# Patient Record
(demographics unavailable — no encounter records)

---

## 2025-07-11 NOTE — PLAN
[FreeTextEntry1] : # HCM: due for mammo and DEXA UTD colonoscopy 2019  # T2DM: consider replacing mounjaro - due to weight loss and low appetite, encourage excercsie and inc protein, check a1c  # HTN: BP at goal continue lisinopril 2.5 mg QD # HLD: Check lipids # fatigue: anemia panel

## 2025-07-11 NOTE — PHYSICAL EXAM
[Normal] : normal rate, regular rhythm, normal S1 and S2 and no murmur heard [de-identified] : pale ocnjunctiva

## 2025-07-11 NOTE — HISTORY OF PRESENT ILLNESS
[FreeTextEntry1] : follow up  [de-identified] : 63 yo hx T2DM, HTN, presenting for follow up.  daughter yamilex here with her c/o weakness, fatigue. doesn't eat a lot of protein. on mounjaro lost a lot of weight. doesn't excercise. busy with her grand children and special needs son.

## 2025-07-14 NOTE — HISTORY OF PRESENT ILLNESS
[FreeTextEntry1] : ***July 14, 2025*** Accompanied by daughter today who reports Mom has been "foggy" lately,  Has not followed up since May 2024 Saw PCP last week.  +mg/dL with noted Electrolyte derangement.  She has only been taking Mounjaro 5mg qwk, Metformin 1000mg BID.  She self-discontinued Tresiba 20u qhs, And NISS B,L,D (7-6-7-1-50-79-14-15-16) Dessert (1-2-3-4-5-6-7-8-9) -- Unclear why she discontinued insulin regimen.      Lab review July 11, 2025 : A1c >15.5%, Sodium 133, Gluc 418  BG on fingerstick today in office reads "HI"  ***May 3, 2024*** Bárbara feels well overall denies new complaints She is presently on Tresiba 25 u qhs, Metformin 1000 mg BID, NISS QID ac Premeal/Dessert B,L,D (4-9-4-8-10-12-14-15-16) Dessert (4-1-6-8-3-5-10-11-12).  Lab review: A1c 7%, LDL 83, K 5.8 Alk Phos 124 Pth 64, Ca 10.3  US Thyroid/Parathyroid: several sub-centimeter cysts/no nodules   ***Apr. 19, 2024*** Bárbara feels well overall denies new complaints. 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   She reports full compliance with Metformin 1000 mg BID, Mounjaro 2.5 mg qwk, Tresiba 25 u q am, NISS QID ac Premeals/Dessert B,L,D (5-3-5-8-10-12-14-15-16) Dessert (7-9-0-8-7-5-10-11-12).   She reports some days her bg can be up in the 200s while other days she struggles to keep bg above 100mg/dL.   She reports 2 subjective HYPOs in the past 2 weeks described as diaphoresis, headache, She reports feeling this way at 88, 98 mg/dL.  Symptoms resolve with sugar water or orange juice.  She endorses correct novolog premeal timing.    ***Nov. 20, 2023*** Ms Palmer feels well overall denies any new complaints she is presently on Metformin 1000 mg BID, Mounjaro 2.5 mg qwk, Tresiba 25 u q am, NISS QID ac Premeals/Dessert B,L,D (9-5-7-8-10-12-14-15-16) Dessert (6-0-5-1-3-1-10-11-12).  She reports taking Tresiba about half the time and Premeal injections less than a quarter of the time.  She cites busy schedule, responsibilities as she is caring for everyone else.  She denies having a family member who checks on her, reminds her to take her meds.  She reports 100% adherence to mounjaro has lost weight, subjectively and has less of an appetite especially for sweets.   She is wearing a juan PRO. Date of download:  11/20/2023   Diabetes Medications and Dosage: as above   Indication for CGMS: verify a change in the treatment regimen, identify periods of hypoglycemia/ hyperglycemia.   Modal day report: pattern.   Pt with HYPO  0% of the time ( NONE below 54),  7% in target range   Hyperglycemia:  93% elevation including 69% > 250 mg/dL   Identified issues: carbohydrate counting   dates analyzed: 11/06/2023 - 11/20/2023  DIABETES HISTORY ***Nov. 6,2023*** Ms. PALMER was diagnosed with Diabetes Mellitus Type 2 after Gestational diabetes x 3  >20 years ago and arrives today for initial evaluation of glycemic control.   She reports history of HTN, dyslipidemia, retinopathy, some neuropathy that comes and goes infrequently and denies CAD, known complications of nephropathy. She is presently on Levemir 12 u q am, Metformin 1000 mg BID Jardiance 25 mg qd consistently.  She is also prescribed Glimeperide, Novolog, Trulicity but has not taken in a while.  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  For these reasons she often does not take care of herself or follow medication regimen.      Blood sugars are checked 1 time a day.   Does not keep logbook, but reported are typically as following: FBG in the high 300s   Hypoglycemia frequency: Denies recent subjective HYPOs but reports feeling weak shaky at BG of 110-120 mg/dL   Fingerstick glucose in the office today is 366 mg/dL fasting   Diet: not following ADA, reports the following typical daily routine: Wakes up at 4;00 am. Drinks Coffee at 6:30 with a drop of milk. Breakfast is at 6:30 am and usually consists of english muffin with pb, nutella, jelly, cake, bagel, waffle pancake.  Lunch is at 11- 3:00 and usually consists of could be pasta, meatballs, sausage and potatoes, steak, salad.  Is usually a big meal.  Dinner is at 6pm and usually consists of salad with deli meats.  Will typically have cup of tea with lemon and will have a sweet dessert at 9-11pm and goes to bed around midnight or 1am, usually wakes up 3-4 times to assist her son.     Exercise: Denies structured exercise regimen, Bárbara is retired to take care of her family, previously worked for [employer].   Lab review: a1c- 13.1% over the past few years she's been in the high 7-9% range lowest on record is 6.8%  , HDL 58, Ca 11.3  Last dilated eye - Dr. Werner, Dr. [facility] May 2024  Last podiatry visit  - Dr. Myers q3 months  Last cardiology evaluation - No, we discussed scheduling with cardiology.    Last stress test -No  Last 2-D Echo -No  Last nephrology evaluation -  Last neurology evaluation-

## 2025-07-14 NOTE — DATA REVIEWED
[FreeTextEntry1] : Constitutional: Denies excessive fatigue. Denies unexplained significant weight changes Skin: Denies diaphoresis or sweating HEENT: Denies vision changes, eye protrusion/dryness Endocrine: No cold intolerance. No heat intolerance CV: Denies chest pain, palpitations or sensation of heart racing Pulmonary: No SOB GI: Denies diarrhea or constipation, nausea/ vomiting Neurological: Denies numbness, paresthesias, tremor Neuropsychiatric: Denies anxiety Musculoskeletal: Denies new joint pain, muscle aches or proximal muscle weakness. No intermittent claudication

## 2025-07-14 NOTE — ASSESSMENT
[FreeTextEntry1] : T2DM  -Go directly to Emergency Department. -Dangers associated with current level of symptomatic hyperglycemia and electrolyte derangement discussed with patient and family.   Patient and family agree to go directly to the ER for treatment.      RTC Follow up ASAP post discharge

## 2025-07-23 NOTE — HISTORY OF PRESENT ILLNESS
[FreeTextEntry1] : ***July 23, 2025*** Bárbara is feeling better.  She was sent to ER from office last visit, was admitted saw Cards,Neurology, Podiatry, Endo and had extensive testing down, She reports no end organ damage was discharged after three days.    She has resumed Glargine 20u qhs, Mounjaro 5mg, Metformin 1000mg BID, Rosuvastatin 20mg qhs.                                                                                                                                                                                    Since discharge 7/16 logbook shows the following FBGs 243, 171, 138, 105, 133mg/dL, PPGs typically between 200and 400mg/dL with few scattered in the 100 range.    ***July 14, 2025*** Accompanied by daughter today who reports Mom has been "foggy" lately,  Has not followed up since May 2024 Saw PCP last week.  +mg/dL with noted Electrolyte derangement.  She has only been taking Mounjaro 5mg qwk, Metformin 1000mg BID.  She self-discontinued Tresiba 20u qhs, And NISS B,L,D (1-9-1-7-17-60-14-15-16) Dessert (1-2-3-4-5-6-7-8-9) -- Unclear why she discontinued insulin regimen.      Lab review July 11, 2025 : A1c >15.5%, Sodium 133, Gluc 418  BG on fingerstick today in office reads "HI"  ***May 3, 2024*** Bárbara feels well overall denies new complaints She is presently on Tresiba 25 u qhs, Metformin 1000 mg BID, NISS QID ac Premeal/Dessert B,L,D (9-7-8-8-10-12-14-15-16) Dessert (0-5-7-4-8-3-10-11-12).  Lab review: A1c 7%, LDL 83, K 5.8 Alk Phos 124 Pth 64, Ca 10.3  US Thyroid/Parathyroid: several sub-centimeter cysts/no nodules   ***Apr. 19, 2024*** Bárbara feels well overall denies new complaints. 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   She reports full compliance with Metformin 1000 mg BID, Mounjaro 2.5 mg qwk, Tresiba 25 u q am, NISS QID ac Premeals/Dessert B,L,D (9-8-2-8-10-12-14-15-16) Dessert (6-0-1-1-2-9-10-11-12).   She reports some days her bg can be up in the 200s while other days she struggles to keep bg above 100mg/dL.   She reports 2 subjective HYPOs in the past 2 weeks described as diaphoresis, headache, She reports feeling this way at 88, 98 mg/dL.  Symptoms resolve with sugar water or orange juice.  She endorses correct novolog premeal timing.    ***Nov. 20, 2023*** Ms Palmer feels well overall denies any new complaints she is presently on Metformin 1000 mg BID, Mounjaro 2.5 mg qwk, Tresiba 25 u q am, NISS QID ac Premeals/Dessert B,L,D (3-2-8-8-10-12-14-15-16) Dessert (6-7-6-2-4-4-10-11-12).  She reports taking Tresiba about half the time and Premeal injections less than a quarter of the time.  She cites busy schedule, responsibilities as she is caring for everyone else.  She denies having a family member who checks on her, reminds her to take her meds.  She reports 100% adherence to mounjaro has lost weight, subjectively and has less of an appetite especially for sweets.   She is wearing a juan PRO. Date of download:  11/20/2023   Diabetes Medications and Dosage: as above   Indication for CGMS: verify a change in the treatment regimen, identify periods of hypoglycemia/ hyperglycemia.   Modal day report: pattern.   Pt with HYPO  0% of the time ( NONE below 54),  7% in target range   Hyperglycemia:  93% elevation including 69% > 250 mg/dL   Identified issues: carbohydrate counting   dates analyzed: 11/06/2023 - 11/20/2023  DIABETES HISTORY ***Nov. 6,2023*** Ms. PALMER was diagnosed with Diabetes Mellitus Type 2 after Gestational diabetes x 3  >20 years ago and arrives today for initial evaluation of glycemic control.   She reports history of HTN, dyslipidemia, retinopathy, some neuropathy that comes and goes infrequently and denies CAD, known complications of nephropathy. She is presently on Levemir 12 u q am, Metformin 1000 mg BID Jardiance 25 mg qd consistently.  She is also prescribed Glimeperide, Novolog, Trulicity but has not taken in a while.  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  For these reasons she often does not take care of herself or follow medication regimen.      Blood sugars are checked 1 time a day.   Does not keep logbook, but reported are typically as following: FBG in the high 300s   Hypoglycemia frequency: Denies recent subjective HYPOs but reports feeling weak shaky at BG of 110-120 mg/dL   Fingerstick glucose in the office today is 366 mg/dL fasting   Diet: not following ADA, reports the following typical daily routine: Wakes up at 4;00 am. Drinks Coffee at 6:30 with a drop of milk. Breakfast is at 6:30 am and usually consists of english muffin with pb, nutella, jelly, cake, bagel, waffle pancake.  Lunch is at 11- 3:00 and usually consists of could be pasta, meatballs, sausage and potatoes, steak, salad.  Is usually a big meal.  Dinner is at 6pm and usually consists of salad with deli meats.  Will typically have cup of tea with lemon and will have a sweet dessert at 9-11pm and goes to bed around midnight or 1am, usually wakes up 3-4 times to assist her son.     Exercise: Denies structured exercise regimen, Bárbara is retired to take care of her family, previously worked for [employer].   Lab review: a1c- 13.1% over the past few years she's been in the high 7-9% range lowest on record is 6.8%  , HDL 58, Ca 11.3  Last dilated eye - Dr. Werner, Dr. [facility] May 2024  Last podiatry visit  - Dr. Myers q3 months  Last cardiology evaluation - No, we discussed scheduling with cardiology.    Last stress test -No  Last 2-D Echo -No  Last nephrology evaluation -  Last neurology evaluation-

## 2025-07-23 NOTE — ASSESSMENT
[FreeTextEntry1] : T2DM  -Begin Sammi 3+ sampled and setup -Continue Metformin 1000mg BID -Continue Mounjaro 5mg qwk -Continue Glargine 20u qhs -Resume Novolog tid ac meals b,l,d (4-7-6-8-10-12-15-16-17) -We discussed the difference between Long and short acting insulins -We discussed troubleshooting common MDI insulin regimen short falls  RTC 1 month